# Patient Record
Sex: FEMALE | Race: WHITE | NOT HISPANIC OR LATINO | Employment: OTHER | URBAN - NONMETROPOLITAN AREA
[De-identification: names, ages, dates, MRNs, and addresses within clinical notes are randomized per-mention and may not be internally consistent; named-entity substitution may affect disease eponyms.]

---

## 2017-01-09 ENCOUNTER — TELEPHONE (OUTPATIENT)
Dept: CARDIOLOGY | Facility: CLINIC | Age: 82
End: 2017-01-09

## 2017-01-09 NOTE — TELEPHONE ENCOUNTER
Oxygen has been ordered through home health. Daughter is waiting to see how this does for her first. She will call if they go to ER.

## 2017-01-09 NOTE — TELEPHONE ENCOUNTER
Daughter Katt states extreme weakness and jerking cannot get her dressed and in a car.   Dr. Talamantes advised for patient to go to [facility] ER by ambulance. Katt states understanding but is going to check with home health first because they was to come today. I ask her to call us and let us know if she went to [facility] ER.